# Patient Record
Sex: FEMALE | Race: WHITE | Employment: FULL TIME
[De-identification: names, ages, dates, MRNs, and addresses within clinical notes are randomized per-mention and may not be internally consistent; named-entity substitution may affect disease eponyms.]

---

## 2017-10-15 ENCOUNTER — HEALTH MAINTENANCE LETTER (OUTPATIENT)
Age: 37
End: 2017-10-15

## 2020-02-24 ENCOUNTER — HEALTH MAINTENANCE LETTER (OUTPATIENT)
Age: 40
End: 2020-02-24

## 2020-03-05 ENCOUNTER — HOSPITAL ENCOUNTER (OUTPATIENT)
Dept: ULTRASOUND IMAGING | Facility: CLINIC | Age: 40
Discharge: HOME | End: 2020-03-05
Attending: OBSTETRICS & GYNECOLOGY | Admitting: OBSTETRICS & GYNECOLOGY
Payer: COMMERCIAL

## 2020-03-05 DIAGNOSIS — R10.11 RUQ PAIN: ICD-10-CM

## 2020-03-05 PROCEDURE — 76700 US EXAM ABDOM COMPLETE: CPT

## 2020-12-13 ENCOUNTER — HEALTH MAINTENANCE LETTER (OUTPATIENT)
Age: 40
End: 2020-12-13

## 2021-04-17 ENCOUNTER — HEALTH MAINTENANCE LETTER (OUTPATIENT)
Age: 41
End: 2021-04-17

## 2021-09-26 ENCOUNTER — HEALTH MAINTENANCE LETTER (OUTPATIENT)
Age: 41
End: 2021-09-26

## 2022-05-08 ENCOUNTER — HEALTH MAINTENANCE LETTER (OUTPATIENT)
Age: 42
End: 2022-05-08

## 2023-04-23 ENCOUNTER — HEALTH MAINTENANCE LETTER (OUTPATIENT)
Age: 43
End: 2023-04-23

## 2023-06-02 ENCOUNTER — HEALTH MAINTENANCE LETTER (OUTPATIENT)
Age: 43
End: 2023-06-02

## 2024-02-11 ENCOUNTER — HEALTH MAINTENANCE LETTER (OUTPATIENT)
Age: 44
End: 2024-02-11

## 2025-06-26 ENCOUNTER — PATIENT OUTREACH (OUTPATIENT)
Dept: ONCOLOGY | Facility: CLINIC | Age: 45
End: 2025-06-26
Payer: COMMERCIAL

## 2025-06-26 ENCOUNTER — TRANSCRIBE ORDERS (OUTPATIENT)
Dept: OTHER | Age: 45
End: 2025-06-26

## 2025-06-26 DIAGNOSIS — C50.919: Primary | ICD-10-CM

## 2025-06-26 NOTE — PROGRESS NOTES
New Patient Oncology Nurse Navigator Note     Referring provider: Self Referred.      Referring Clinic/Organization: [facility]      Referred to (specialty:) Medical Oncology     Requested provider (if applicable): NA     Date Referral Received: June 26, 2025     Evaluation for:  C50.919 (ICD-10-CM) - Lobular carcinoma (H)      Clinical History (per Nurse review of records provided):      Stage: Cancer Staging   Breast cancer (HC)  Staging form: Breast, AJCC 8th Edition  - Clinical stage from 5/13/2025: Stage IB (cT1c, cN1(f), cM0, G2, ER+, WA+, HER2-) - Signed by Yesenia Galdamez MD on 5/13/2025  - Pathologic: Stage IB (pT2, pN2, cM0, G2, ER+, WA+, HER2-) - Signed by Alecia Sierra PA on 6/3/2025    Surgery date 5/30/25     Final Diagnosis   A) LEFT BREAST, NIPPLE-SPARING MASTECTOMY:  1. Tumor #1 (2:00, X-shaped clip):  Invasive lobular carcinoma, [place] grade II of III  a. Size: 11 mm  b. Margins: All margins negative for invasive carcinoma, 3 mm to nearest (anterior-superior) margin   c. Breast Ancillary Testing: Performed on prior case ([id number])  Hormone Receptors:  Estrogen receptor: Positive (97%, strong staining)  Progesterone receptor: Positive (97%, strong staining)  HER2 by IHC: Negative (1+ by manual morphometry)  Ki-67: 10%  d. Core biopsy site associated with carcinoma  2. Tumor #2, #3 and intervening tissue (3:00, Q-shaped clip & 4:00, vision clip) in aggregate (see comment):  Invasive lobular carcinoma, [place] grade II of III  a. Size: 45 mm  b. Margins: All margins negative for invasive carcinoma, >3 mm to all margins  c. Breast Ancillary Testing: Performed on prior case ([id number])  Hormone Receptors:  Estrogen receptor: Positive (94%, strong staining)  Progesterone receptor: Positive (90%, strong staining)  HER2 by IHC: Negative (1+ by manual morphometry)  Ki-67: 3%  d. Core biopsy sites associated with carcinoma  3. Lobular carcinoma in situ (LCIS): Classical type,  innumerable foci throughout entire mastectomy  4. Atypical lobular hyperplasia (ALH), multiple foci (including involvement of nipple bed)    B) LEFT BREAST, NIPPLE SHAVIN. Breast tissue, negative for atypia and malignancy    C) LEFT AXILLARY LYMPH NODES, TARGETED DISSECTION:  1. Two lymph nodes, one of these positive for metastatic lobular carcinoma (1/2)  a. Size of largest metastatic focus: 10 mm  b. Extracapsular extension: present, <1 mm  2. Core biopsy site / clip associated with the positive lymph node    D) LEFT AXILLARY SENTINEL LYMPH NODES, EXCISION:  1. Five lymph nodes, three of these positive for metastatic lobular carcinoma (3/5)  a. Size of largest metastatic focus: 12 mm  b. Extracapsular extension: present, <1 mm    E) RIGHT BREAST, ORIENTED PROPHYLACTIC NIPPLE-SPARING MASTECTOMY:  1. Atypical lobular hyperplasia (ALH), multiple foci  2. Background breast tissue with fibrocystic changes and fibroadenoma (4 mm)  3. Nipple bed breast tissue with no histopathologic abnormality    F) RIGHT BREAST, NIPPLE SHAVIN. Breast tissue, negative for atypia and malignancy     In summary she had multifocal L ILC with 4 positive LNS in L axilla ER+VA+ her 2-   KI67 low     R breast showed ALH     Patient seen on  By Dr. Sandra Boyd at [facility].   Per Dr. Boyd  Sandra Bee is a 44 y.o. premenopausal female with a pathologic stage pT2 pN2 cM0 ILC L breast strongly ER+VA+ Her2- Based on the fact that she is premenopausal and has 4 positive LNs we discussed her case at tumor board and the consensus was to treat her with adjuvant chemotherapy followed by radiation and the adjuvant hormonal Rx .The best hormonal Rx would be zoladex , an aromatase inhibitor and a CDK4/6 inhibitor .We spent most of the consult discussing adjuvant chemotherapy and adjuvant hormonal manipulation. Her mammoprint was low risk and so she is aware that the absolute benefit from chemotherapy would potentially be small .She  has reservations about any chemo therapy so I suggested also sending an oncotype as I think that data may help us make a final decision. She is willing to proceed with both the radiation and the hormone treatment and we discussed at length the hormone manipulation with zoladex , AI and CDK4/6 inhibitor. I have spent all of the consult today with the patient and her  discussing the above We will plan to start zoladex this week and then wait for her oncotype       Plan:  RTC MD 2-3 weeks   Send oncotype   Start Zoladex     Radiation consultation with Dr. Conklin at [facility] on 6/25/25    HISTORY  Ms. 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    (10/24/2024): Bilateral screening mammogram showed extremely dense breasts with no dominant masses, suspicious microcalcifications, or areas of architectural distortion.    (4/24/2025): Bilateral screening breast MRI showed multiple enhancing masses in the left upper outer posterior breast that were new from prior study as well as increase in size of a previously biopsied mass at the 2 o'clock position. There were no suspicious areas of enhancement in the right breast. There were prominent left axillary lymph nodes but no abnormal morphology right axillary lymph nodes.    (5/5/2025): Ultrasound of the left breast showed a 14 mm vague area of distortion in the 2 o'clock position residing close to a clip suspected to represent the patient's prior area of biopsied PASH. In the 2 o'clock position 4 cm from the nipple was a 10 mm hypoechoic irregular mass. In the 2 o'clock position 5 cm from the nipple were 2 masses in close proximity. In the 3 o'clock position 4 cm from the nipple was an 8 mm irregular, hypoechoic mass in any 4 o'clock position 7 cm  from the nipple was a 6 mm irregular hypoechoic mass. Left axillary ultrasound demonstrated borderline cortical thickness and a lymph node in the low axilla.    (5/5/2025): Ultrasound-guided core biopsy of the left breast 2 o'clock position 4 cm from the nipple revealed invasive lobular carcinoma grade II, ER/UT positive, HER2 negative, Ki-67 10%. Biopsy of the left breast 3 o'clock position 4 cm from the nipple revealed a grade II invasive lobular carcinoma, ER/UT positive, HER2 negative, Ki-67 3%. Biopsy of the left breast 4 o'clock position 7 cm from the nipple revealed high-grade II invasive lobular carcinoma, ER/UT positive, HER2 negative, Ki-67 3% (Case: [id number]). Ultrasound-guided core biopsy of the left axillary lymph node confirmed metastatic lobular carcinoma (Case: [id number]). Her tissue was sent for MammaPrint testing returning low risk luminal A.    (5/15/2025): MRI of the brain showed no evidence for metastatic disease.    (5/16/2025): [place] PET/CT showed 2 subtle FES avid left axillary lymph nodes suspicious for early sites of estrogen receptor positive disease. There was no uptake localized to the known multifocal breast sites in the left breast.    (5/30/2025): She underwent nipple sparing left mastectomy, left sentinel lymph node biopsy, right prophylactic nipple sparing mastectomy, and bilateral tissue expander placements performed by Etelvina Galdamez and Devi Gonzalez, pathology revealing an 11 mm grade II invasive lobular carcinoma, and a 45 mm grade II invasive lobular carcinoma. Margins were negative, the closest margin measuring 3 mm anterior-superiorly. 4/7 left axillary lymph nodes showed evidence of metastatic disease, the largest metastatic deposit measuring 12 mm with 1 mm of extranodal extension (Case: [id number]).     (6/5/2025): Required excision of necrotic tissue along the bilateral chest wall.    (6/23/2025): Consultation with Dr. Sandra Boyd in medical oncology who  recommended adjuvant chemotherapy. The patient is hesitant to proceed with chemotherapy so an Oncotype was ordered.     RECOMMENDATIONS  I have recommended postmastectomy radiation to her left chest wall, left axilla, left supraclavicular fossa and left internal mammary lymph nodes given the 4/7 positive left axillary lymph nodes with extracapsular extension, her young age, and her multifocal disease. I reviewed the rationale, schedule, risks, and side effects of postmastectomy radiation to the patient. Potential side effects discussed included but were not limited to fatigue, decreased blood counts, skin irritation or breakdown, fibrosis or shrinkage of the irradiated skin, wound healing difficulties with future left chest wall surgeries, muscle contractures, chronic chest pain, rib weakening, costochondritis, lung scarring, pneumonitis, dry cough during the radiation, lymphedema, brachioplexopathy, esophagitis, hypothyroidism, second malignancy and coronary artery disease. We discussed the use of breath hold respiratory gating to decrease the dose of radiation to her heart or other radiation techniques should her anatomy dictate.     We discussed timing of radiation to occur ideally within 8-12 weeks of her mastectomy unless chemotherapy is recommended, in which case radiation would follow chemotherapy. She could possibly require a rapid tissue expansion which could increase her risk of skin breakdown with the radiation.     The patient's questions and concerns were answered to her satisfaction. She would like to proceed with the radiation therapy as soon as possible. Her tissue was sent for Oncotype DX testing and she has a follow-up appointment with Dr. Boyd to review the results on 7/9/2025 at 11:30 AM. I therefore tentatively scheduled her for a chest CT for treatment planning purposes for that afternoon which she will cancel if she decides to proceed with chemotherapy first. Given the medial location of her  tissue expanders, I do feel that she will need to have her contralateral tissue expander deflated which we can do in our CT simulator or have Dr. Gonzalez deflate prior to simulation. I will contact Dr. Gonzalez to discuss further. The patient has been encouraged to contact us should she have any questions or concerns in the interim.      Records Location: Care Everywhere, Media, and See Bookmarked material     Records Needed:   Path reports-biopsy and surgery (as applicable)  Pathology reviews (as applicable)  Breast imaging for past 5 years  Systemic imaging (as applicable)  Med onc notes, rad notes, OP note, surgeons note (as applicable)  Genetic results (as applicable)  Echo or MUGA results (as applicable)  Radiation summary (as applicable)  Chemotherapy summary(as applicable)     Additional testing needed prior to consult: NA    Payor: [employer] / Plan: [employer] OPEN ACCESS / Product Type: O /     June 26, 2025  Referral received and reviewed.   Called patient this afternoon in to introduce self and the role of nurse navigation as well as discuss referral with patient. Patient is seeking second opinion with medical oncology in regards to the recommendation of chemotherapy. Sent patient to NPS to schedule.     Daniela YOSTN, RN, OCN  Oncology Nurse Navigator   [facility]  Cancer Care Service Line   New Patient Hem/Onc Scheduling / Referrals: [phone] (fax: [phone] )

## 2025-07-01 NOTE — TELEPHONE ENCOUNTER
RECORDS STATUS - ALL OTHER DIAGNOSIS      RECORDS RECEIVED FROM: [facility]   NOTES STATUS DETAILS   OFFICE NOTE from referring provider SELF REFERRAL    OFFICE NOTE from medical oncologist [facility] 6/23/2025 - Dr. Sandra Boyd  5/13/2025 - Dr. Yesenia Galdamez   OFFICE NOTE from SURGERY [facility] 6/5/2025 - Dr. Devi Gonzalez   OFFICE NOTE from rad onc [facility] 5/29/2025 - Darlene Conklin   OPERATIVE REPORT [facility] 5/30/2025 - Bilateral mastectomy, intraoperative sentinel lymph node mapping, LEFT sentinel lymph node biopsy, possible left completion lymph node dissection     MEDICATION LIST [facility]    LABS     PATHOLOGY REPORTS Req from [facility] 5/30/2025 - [id number]     5/8/2025 - [id number]     5/5/2025 - [id number]      1/29/2024 - [id number]    6/30/2022 - [id number]    ANYTHING RELATED TO DIAGNOSIS [facility] 6/5/2025   PATHOLOGY FEDEX TRACKING   TRACKING #:  [id number]   GENONOMIC TESTING     TYPE:     IMAGING (NEED IMAGES & REPORT)     CT SCANS     MRI  MR Brain: 5/15/2025  MR Breast: 4/25/2025-11/3/2020   MAMMOS Req from [facility] 5/30/2025-6/23/2020   ULTRASOUND Req from [facility] US Breast: 5/30/2025-6/30/2022  US Bx Lymph Node: 5/8/2025  US Axillary: 5/6/2025   PET     IMAGE DISC FEDEX TRACKING   TRACKING #:

## 2025-07-06 NOTE — PROGRESS NOTES
[facility] Medical Oncology Consultation      Date of visit: July 7, 2025  New Outpatient Clinic Note        Assessment:     High risk, though pathologic prognostic stage IB, invasive lobular breast cancer in this premenopausal woman.  She had a tumor that was nearly 5 cm with 4 nodes involved.  She has received mastectomy and I think all of us would agree that postmastectomy radiotherapy is indicated.  In terms of her systemic therapy, to me it is straightforward.  Both genomic tests that were sent show no real obvious benefit to adjuvant chemotherapy. In particular, the  MINDACT trial demonstrated that women with a MammaPrint low-risk result, including those with up to three positive lymph nodes, had excellent outcomes with endocrine therapy alone, and chemotherapy did not provide additional benefit. Yes, Sandra had 4 nodes involved, but the absolute difference between 3 and 4 nodes is likely small, if any.  I do not recommend chemotherapy.  I do think she would benefit from ovarian function suppression, an aromatase inhibitor, as well as the addition of Ribociclib (per the ELVI trial).    I had a long and involved conversation with Sandra and her  Pradeep in clinic today.  Many questions were asked and hopefully answered to her.      Plan:     I do not recommend adjuvant chemotherapy  Continue ovarian function suppression with Dr. Boyd.  I do recommend a every 3 month dosing of goserelin.  Recent abstract presented at the [facility] 2024  showed absolutely no difference in outcome between monthly versus 3 monthly, but a higher quality of life in those getting the every 3 month dose.  Follow-up with Dr. Conklin for postmastectomy radiotherapy  Return to clinic with me on an as-needed basis.    Mac Horton, MS-4    Todd Arellano MD, MSc  Associate Professor of Medicine  [employer]  [facility]  [address] [place] [address]  [id number]  [phone]    __________________________________________________________________    DIAGNOSIS     Pathologic prognostic stage IB ([id number]) invasive lobular breast cancer, diagnosed at bilateral mastectomy, 2025  Final path showed:  Multifocal grade II ILC, largest tumor 4.5 cm, with 4/7 axillary LNs positive, (1 mm of LEONOR).  Tumor is ER/FL 97% strongly positive with a low risk Mammaprint. Ki-67 is 10%. Oncotype RS 12.  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 Sandra was followed in a high risk clinic prior to her diagnosis.    THERAPY TO DATE:     Bilateral mastectomy 2025  Goserelin recently initiated.  She has been recommended adjuvant chemotherapy by .  Is here today to get another opinion.    Interval history:     Sandra is here with her  him to get another opinion regarding further evaluation and management of her cancer.    Tells me that she is traumatized by the very idea of chemotherapy.  Her mother, who  of metastatic breast cancer, had a lot of issues with it.  Another friend's mother went into a coma after her sixth cycle for lymphoma and .  2 other close friends/relations also had severe toxicity.  Really wants to avoid getting chemotherapy if at all possible.  Is having some hot flashes after her Zoladex.  Is a very active person.  Likes to do what she calls [employer].      On ROS in addition to the above    Denies  fevers, chills, NS, HA, dysphagia/odynophagia, change in weight, change in appetite, cough, SOB, CP, n/v, abd pain, constipation/diarrhea, hematochezia, dysuria, hematuria, swelling, rashes, lymphadenopathy      Past Medical History:      Past Medical History:   Diagnosis Date    Abnormal Pap smear     Allergic rhinitis     Asthma     not current    Migraines     PONV (postoperative nausea and vomiting)           Past Surgical History:    I have reviewed this patient's past surgical history       Social History:   Tobacco, ETOH, and rec drugs  reviewed and as noted below with the following exceptions:  Sandra grew up in [place] and went to [employer] in high school, even though she was in nearby [place] ([place]).  This is the home of [employer].  She then attended [employer] and majored in youth ministry.  That was in [place] and she graduated in 2005.  She met her  him at [employer], and even though people did not want them to get together, they found a way to do so.  They  in 2008.  Pradeep is somewhat of a rebel who idealize [employer] for a brief period of his life.  [place] moved to the [place] where both work for a new [employer] called the [employer].  They live in [place].  They have 2 children, Jenny and Marlen ages 12 and 14.  Sandra is a .    Family History:     No family history on file.         Medications:     Current Outpatient Medications   Medication Sig Dispense Refill    Acetaminophen (TYLENOL PO) Take 1,000 mg by mouth every 8 hours as needed for mild pain or fever      cephALEXin (KEFLEX) 500 MG capsule Take 1 capsule (500 mg) by mouth 2 times daily 20 capsule 0    fluticasone (FLONASE) 50 MCG/ACT nasal spray Spray 2 sprays into both nostrils daily      HYDROcodone-acetaminophen (NORCO) 5-325 MG per tablet Take 1-2 tablets by mouth every 4 hours as needed for other (Moderate to Severe Pain) 50 tablet 0    ibuprofen (ADVIL,MOTRIN) 600 MG tablet Take 1 tablet (600 mg) by mouth every 6 hours as needed for pain (mild) 60 tablet 5    multivitamin  peds with iron (FLINTSTONES COMPLETE) 60 MG chewable tablet Take 2 chew tab by mouth daily                Physical Exam:   unknown if currently breastfeeding.    No exam was done due to the consultative nature of our visit.  Data:       No results found for this or any previous visit (from the past 24 hours).    Other Data           Labs, imaging and treatment plan reviewed with patient. All questions  answered.        60 minutes spent on the date of the encounter doing chart review, review of outside records, review of test results, interpretation of tests, patient visit, documentation, discussion with other provider(s), and discussion with family

## 2025-07-07 ENCOUNTER — ONCOLOGY VISIT (OUTPATIENT)
Dept: ONCOLOGY | Facility: CLINIC | Age: 45
End: 2025-07-07
Attending: INTERNAL MEDICINE
Payer: COMMERCIAL

## 2025-07-07 ENCOUNTER — PRE VISIT (OUTPATIENT)
Dept: ONCOLOGY | Facility: CLINIC | Age: 45
End: 2025-07-07
Payer: COMMERCIAL

## 2025-07-07 VITALS
TEMPERATURE: 99.6 F | SYSTOLIC BLOOD PRESSURE: 157 MMHG | WEIGHT: 159 LBS | BODY MASS INDEX: 25.55 KG/M2 | HEART RATE: 88 BPM | OXYGEN SATURATION: 99 % | RESPIRATION RATE: 18 BRPM | HEIGHT: 66 IN | DIASTOLIC BLOOD PRESSURE: 96 MMHG

## 2025-07-07 DIAGNOSIS — C50.911 LOBULAR BREAST CANCER, RIGHT (H): Primary | ICD-10-CM

## 2025-07-07 PROCEDURE — 99213 OFFICE O/P EST LOW 20 MIN: CPT | Performed by: INTERNAL MEDICINE

## 2025-07-07 PROCEDURE — 99205 OFFICE O/P NEW HI 60 MIN: CPT | Performed by: INTERNAL MEDICINE

## 2025-07-07 RX ORDER — CETIRIZINE HYDROCHLORIDE 10 MG/1
TABLET ORAL
COMMUNITY

## 2025-07-07 ASSESSMENT — PAIN SCALES - GENERAL: PAINLEVEL_OUTOF10: NO PAIN (0)

## 2025-07-07 NOTE — LETTER
7/7/2025      Sandra Bee  [address]  [facility] [address]      Dear Colleague,    Thank you for referring your patient, Sandra Bee, to the [facility]. Please see a copy of my visit note below.      [facility] Medical Oncology Consultation      Date of visit: July 7, 2025  New Outpatient Clinic Note        Assessment:     High risk, though pathologic prognostic stage IB, invasive lobular breast cancer in this premenopausal woman.  She had a tumor that was nearly 5 cm with 4 nodes involved.  She has received mastectomy and I think all of us would agree that postmastectomy radiotherapy is indicated.  In terms of her systemic therapy, to me it is straightforward.  Both genomic tests that were sent show no real obvious benefit to adjuvant chemotherapy. In particular, the  MINDACT trial demonstrated that women with a MammaPrint low-risk result, including those with up to three positive lymph nodes, had excellent outcomes with endocrine therapy alone, and chemotherapy did not provide additional benefit. Yes, Sandra had 4 nodes involved, but the absolute difference between 3 and 4 nodes is likely small, if any.  I do not recommend chemotherapy.  I do think she would benefit from ovarian function suppression, an aromatase inhibitor, as well as the addition of Ribociclib (per the ELVI trial).    I had a long and involved conversation with Sandra and her  Pradeep in clinic today.  Many questions were asked and hopefully answered to her.      Plan:     I do not recommend adjuvant chemotherapy  Continue ovarian function suppression with Dr. Boyd.  I do recommend a every 3 month dosing of goserelin.  Recent abstract presented at the [facility] 2024  showed absolutely no difference in outcome between monthly versus 3 monthly, but a higher quality of life in those getting the every 3 month dose.  Follow-up with Dr. Conklin for postmastectomy radiotherapy  Return to  clinic with me on an as-needed basis.    Mac Horton, MS-4    Todd Arellano MD, MSc  Associate Professor of Medicine  [employer]  [facility]  [address] [place] [address]  [phone]    __________________________________________________________________    DIAGNOSIS     Pathologic prognostic stage IB ([id number]) invasive lobular breast cancer, diagnosed at bilateral mastectomy, 2025  Final path showed:  Multifocal grade II ILC, largest tumor 4.5 cm, with 4/7 axillary LNs positive, (1 mm of LEONOR).  Tumor is ER/NM 97% strongly positive with a low risk Mammaprint. Ki-67 is 10%. Oncotype RS 12.  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 Sandra was followed in a high risk clinic prior to her diagnosis.    THERAPY TO DATE:     Bilateral mastectomy 2025  Goserelin recently initiated.  She has been recommended adjuvant chemotherapy by .  Is here today to get another opinion.    Interval history:     Sandra is here with her  him to get another opinion regarding further evaluation and management of her cancer.    Tells me that she is traumatized by the very idea of chemotherapy.  Her mother, who  of metastatic breast cancer, had a lot of issues with it.  Another friend's mother went into a coma after her sixth cycle for lymphoma and .  2 other close friends/relations also had severe toxicity.  Really wants to avoid getting chemotherapy if at all possible.  Is having some hot flashes after her Zoladex.  Is a very active person.  Likes to do what she calls [employer].      On ROS in addition to the above    Denies  fevers, chills, NS, HA, dysphagia/odynophagia, change in weight, change in appetite, cough, SOB, CP, n/v, abd pain, constipation/diarrhea, hematochezia, dysuria, hematuria, swelling, rashes, lymphadenopathy      Past Medical History:      Past Medical History:   Diagnosis Date     Abnormal Pap smear      Allergic rhinitis       Asthma     not current     Migraines      PONV (postoperative nausea and vomiting)           Past Surgical History:    I have reviewed this patient's past surgical history       Social History:   Tobacco, ETOH, and rec drugs reviewed and as noted below with the following exceptions:  Sandra grew up in [place] and went to [employer] in high school, even though she was in nearby [place] ([place]).  This is the home of [employer].  She then attended [employer] and majored in youth ministry.  That was in [place] [facility] and she graduated in 2005.  She met her  him at [employer], and even though people did not want them to get together, they found a way to do so.  They  in 2008.  Pradeep is somewhat of a rebel who idealize [employer] for a brief period of his life.  They moved to the [place] where both work for a new [employer] called [employer].  They live in [place] [facility].  They have 2 children, Jenny and Marlen ages 12 and 14.  Sandra is a .    Family History:     No family history on file.         Medications:     Current Outpatient Medications   Medication Sig Dispense Refill     Acetaminophen (TYLENOL PO) Take 1,000 mg by mouth every 8 hours as needed for mild pain or fever       cephALEXin (KEFLEX) 500 MG capsule Take 1 capsule (500 mg) by mouth 2 times daily 20 capsule 0     fluticasone (FLONASE) 50 MCG/ACT nasal spray Spray 2 sprays into both nostrils daily       HYDROcodone-acetaminophen (NORCO) 5-325 MG per tablet Take 1-2 tablets by mouth every 4 hours as needed for other (Moderate to Severe Pain) 50 tablet 0     ibuprofen (ADVIL,MOTRIN) 600 MG tablet Take 1 tablet (600 mg) by mouth every 6 hours as needed for pain (mild) 60 tablet 5     multivitamin  peds with iron (FLINTSTONES COMPLETE) 60 MG chewable tablet Take 2 chew tab by mouth daily                Physical Exam:   unknown if currently breastfeeding.    No exam was done due to the  consultative nature of our visit.  Data:       No results found for this or any previous visit (from the past 24 hours).    Other Data           Labs, imaging and treatment plan reviewed with patient. All questions answered.        60 minutes spent on the date of the encounter doing chart review, review of outside records, review of test results, interpretation of tests, patient visit, documentation, discussion with other provider(s), and discussion with family             Again, thank you for allowing me to participate in the care of your patient.        Sincerely,        Todd Arellano MD    Electronically signed

## 2025-07-07 NOTE — NURSING NOTE
"Oncology Rooming Note    July 7, 2025 12:04 PM   Sandra Bee is a 44 year old female who presents for:    Chief Complaint   Patient presents with    Oncology Clinic Visit     New Oncology     Initial Vitals: BP (!) 157/96   Pulse 88   Temp 99.6  F (37.6  C) (Oral)   Resp 18   Ht 1.664 m (5' 5.5\")   Wt 72.1 kg (159 lb)   SpO2 99%   BMI 26.06 kg/m   Estimated body mass index is 26.06 kg/m  as calculated from the following:    Height as of this encounter: 1.664 m (5' 5.5\").    Weight as of this encounter: 72.1 kg (159 lb). Body surface area is 1.83 meters squared.  No Pain (0) Comment: Data Unavailable   No LMP recorded.  Allergies reviewed: Yes  Medications reviewed: Yes    Medications: Medication refills not needed today.  Pharmacy name entered into [employer]: [id number] [id number]    Frailty Screening:   Is the patient here for a new oncology consult visit in cancer care? 2. No    PHQ9:  Did this patient require a PHQ9?: No      Clinical concerns: Getting a second opinion      Shorty Gomez LPN              "

## 2025-07-13 ENCOUNTER — HEALTH MAINTENANCE LETTER (OUTPATIENT)
Age: 45
End: 2025-07-13

## 2025-08-06 ENCOUNTER — LAB REQUISITION (OUTPATIENT)
Dept: LAB | Facility: CLINIC | Age: 45
End: 2025-08-06
Payer: COMMERCIAL

## 2025-08-06 PROCEDURE — 88321 CONSLTJ&REPRT SLD PREP ELSWR: CPT | Performed by: STUDENT IN AN ORGANIZED HEALTH CARE EDUCATION/TRAINING PROGRAM

## 2025-08-10 LAB
PATH REPORT.COMMENTS IMP SPEC: ABNORMAL
PATH REPORT.COMMENTS IMP SPEC: YES
PATH REPORT.FINAL DX SPEC: ABNORMAL
PATH REPORT.GROSS SPEC: ABNORMAL
PATH REPORT.MICROSCOPIC SPEC OTHER STN: ABNORMAL
PATH REPORT.RELEVANT HX SPEC: ABNORMAL
PATH REPORT.RELEVANT HX SPEC: ABNORMAL
PATH REPORT.SITE OF ORIGIN SPEC: ABNORMAL